# Patient Record
(demographics unavailable — no encounter records)

---

## 2024-11-11 NOTE — HISTORY OF PRESENT ILLNESS
[de-identified] : 61 yo M with h/o HTN presents with several months of right inguinal bulging and discomfort. He denies episodes of incarceration. He denies prior surgery. He denies issues on the left side.

## 2024-11-11 NOTE — ASSESSMENT
[FreeTextEntry1] : 63 yo M with symptomatic right inguinal hernia.  I have recommended a Robotic-assisted right possible left inguinal hernia repair with mesh. We had a long discussion about the risks and benefits of hernia repair including bleeding, infection, mesh migration, bowel injury, and hernia recurrence. We discussed the expected recovery. We also discussed the risk of bowel incarceration if the hernia is not repaired.  The patient understands and accepts these risks and would like to proceed. Surgery will be performed at [facility] as an ambulatory procedure under general anesthesia.  Patient will obtain medical clearance prior to surgery. Patient will book Nov 21.2024.

## 2024-11-11 NOTE — PHYSICAL EXAM
[Respiratory Effort] : normal respiratory effort [Normal Rate and Rhythm] : normal rate and rhythm [No Rash or Lesion] : No rash or lesion [Calm] : calm [de-identified] : NAD, well-appearing  [de-identified] : anicteric [de-identified] : soft, nondistended, nontender, reducible right inguinal hernia

## 2024-11-11 NOTE — CONSULT LETTER
[Dear  ___] : Dear  [unfilled], [( Thank you for referring [unfilled] for consultation for _____ )] : Thank you for referring [unfilled] for consultation for [unfilled] [Please see my note below.] : Please see my note below. [Consult Closing:] : Thank you very much for allowing me to participate in the care of this patient.  If you have any questions, please do not hesitate to contact me. [Sincerely,] : Sincerely, [FreeTextEntry3] : Phylicia Mota MD

## 2024-12-09 NOTE — HISTORY OF PRESENT ILLNESS
[de-identified] : Patient returns s/p robotic assisted right inguinal hernia repair with mesh. He is doing well and has no complaints. He is healing well and has no new lumps, no pain and not much bruising.

## 2024-12-09 NOTE — ASSESSMENT
[FreeTextEntry1] : 61 yo M s/p robotic right inguinal hernia repair with mesh. Recovering well.  Gradual return to normal diet and activity Follow up as needed

## 2024-12-09 NOTE — CONSULT LETTER
[Dear  ___] : Dear  [unfilled], [Courtesy Letter:] : I had the pleasure of seeing your patient, [unfilled], in my office today. [Please see my note below.] : Please see my note below. [Consult Closing:] : Thank you very much for allowing me to participate in the care of this patient.  If you have any questions, please do not hesitate to contact me. [Sincerely,] : Sincerely, [FreeTextEntry1] : Enclosed please find my operative report, pathology report and postop visit note.  [FreeTextEntry3] : Phylicia Mota MD

## 2024-12-09 NOTE — PHYSICAL EXAM
[Respiratory Effort] : normal respiratory effort [Normal Rate and Rhythm] : normal rate and rhythm [Calm] : calm [de-identified] : NAD, well-appearing  [de-identified] : anicteric  [de-identified] : soft, nondistended, nontender, well-healing incisions c/d/i; no hernia recurrence